# Patient Record
Sex: FEMALE | Race: WHITE | Employment: FULL TIME | URBAN - METROPOLITAN AREA
[De-identification: names, ages, dates, MRNs, and addresses within clinical notes are randomized per-mention and may not be internally consistent; named-entity substitution may affect disease eponyms.]

---

## 2024-10-21 ENCOUNTER — APPOINTMENT (OUTPATIENT)
Dept: GENERAL RADIOLOGY | Age: 49
End: 2024-10-21
Payer: COMMERCIAL

## 2024-10-21 ENCOUNTER — APPOINTMENT (OUTPATIENT)
Dept: CT IMAGING | Age: 49
End: 2024-10-21
Payer: COMMERCIAL

## 2024-10-21 ENCOUNTER — HOSPITAL ENCOUNTER (EMERGENCY)
Age: 49
Discharge: HOME | End: 2024-10-21
Payer: COMMERCIAL

## 2024-10-21 VITALS
SYSTOLIC BLOOD PRESSURE: 163 MMHG | BODY MASS INDEX: 47.09 KG/M2 | OXYGEN SATURATION: 96 % | RESPIRATION RATE: 16 BRPM | TEMPERATURE: 98.4 F | WEIGHT: 293 LBS | HEART RATE: 78 BPM | DIASTOLIC BLOOD PRESSURE: 76 MMHG | HEIGHT: 66 IN

## 2024-10-21 DIAGNOSIS — S80.12XA CONTUSION OF LEFT LOWER EXTREMITY, INITIAL ENCOUNTER: ICD-10-CM

## 2024-10-21 DIAGNOSIS — S99.912A INJURY OF LEFT ANKLE, INITIAL ENCOUNTER: Primary | ICD-10-CM

## 2024-10-21 PROCEDURE — 73560 X-RAY EXAM OF KNEE 1 OR 2: CPT

## 2024-10-21 PROCEDURE — 73610 X-RAY EXAM OF ANKLE: CPT

## 2024-10-21 PROCEDURE — 73700 CT LOWER EXTREMITY W/O DYE: CPT

## 2024-10-21 PROCEDURE — 6370000000 HC RX 637 (ALT 250 FOR IP): Performed by: PHYSICIAN ASSISTANT

## 2024-10-21 PROCEDURE — 99284 EMERGENCY DEPT VISIT MOD MDM: CPT

## 2024-10-21 RX ORDER — PANTOPRAZOLE SODIUM 40 MG/1
40 TABLET, DELAYED RELEASE ORAL DAILY
COMMUNITY

## 2024-10-21 RX ORDER — PROPRANOLOL HYDROCHLORIDE 60 MG/1
60 CAPSULE, EXTENDED RELEASE ORAL DAILY
COMMUNITY

## 2024-10-21 RX ORDER — PREDNISONE 10 MG/1
10 TABLET ORAL DAILY
COMMUNITY

## 2024-10-21 RX ORDER — FLUOXETINE 40 MG/1
40 CAPSULE ORAL DAILY
COMMUNITY

## 2024-10-21 RX ORDER — BUPROPION HYDROCHLORIDE 150 MG/1
150 TABLET, EXTENDED RELEASE ORAL 2 TIMES DAILY
COMMUNITY

## 2024-10-21 RX ORDER — IBUPROFEN 400 MG/1
800 TABLET, FILM COATED ORAL ONCE
Status: COMPLETED | OUTPATIENT
Start: 2024-10-21 | End: 2024-10-21

## 2024-10-21 RX ORDER — ERGOCALCIFEROL 1.25 MG/1
50000 CAPSULE ORAL WEEKLY
COMMUNITY

## 2024-10-21 RX ORDER — ALBUTEROL SULFATE 90 UG/1
2 INHALANT RESPIRATORY (INHALATION) EVERY 6 HOURS PRN
COMMUNITY

## 2024-10-21 RX ORDER — IBUPROFEN 800 MG/1
800 TABLET, FILM COATED ORAL EVERY 6 HOURS PRN
COMMUNITY

## 2024-10-21 RX ORDER — ROSUVASTATIN CALCIUM 40 MG/1
40 TABLET, COATED ORAL EVERY EVENING
COMMUNITY

## 2024-10-21 RX ORDER — ALPRAZOLAM 0.25 MG/1
0.25 TABLET ORAL NIGHTLY PRN
COMMUNITY

## 2024-10-21 RX ORDER — EZETIMIBE 10 MG/1
10 TABLET ORAL DAILY
COMMUNITY

## 2024-10-21 RX ADMIN — IBUPROFEN 800 MG: 400 TABLET, FILM COATED ORAL at 14:29

## 2024-10-21 ASSESSMENT — PAIN SCALES - GENERAL
PAINLEVEL_OUTOF10: 7
PAINLEVEL_OUTOF10: 6

## 2024-10-21 ASSESSMENT — ENCOUNTER SYMPTOMS
RESPIRATORY NEGATIVE: 1
ABDOMINAL PAIN: 0
EYES NEGATIVE: 1
ALLERGIC/IMMUNOLOGIC NEGATIVE: 1
SHORTNESS OF BREATH: 0
COLOR CHANGE: 0

## 2024-10-21 ASSESSMENT — PAIN DESCRIPTION - ORIENTATION: ORIENTATION: LEFT

## 2024-10-21 ASSESSMENT — PAIN DESCRIPTION - LOCATION: LOCATION: ANKLE

## 2024-10-21 ASSESSMENT — PAIN - FUNCTIONAL ASSESSMENT: PAIN_FUNCTIONAL_ASSESSMENT: 0-10

## 2024-10-21 NOTE — DISCHARGE INSTRUCTIONS
ANKLE SPRAIN Instructions      A sprained ankle occurs when a sudden movement or fall stretches the tendons, muscles or ligaments that surround the ankle.  This causes pain and swelling around the ankle.  The injury will heal, given time and rest.    Return to the ER Immediately if:  1.  If your pain worsens.  2.  You develop numbness or weakness.  3.  Your foot or leg become blue, purple, or white.  4.  You develop worsening, new, or concerning symptoms.       Care and Treatment:    1. For the first 48 hrs., applying an ice pack wrapped in a cloth over the area for 15 minutes every hour will help to prevent swelling and reduce pain.    2. For the first 48 hours, keep the injured foot elevated as much as possible.  Elevation and ice packs will help reduce pain and swelling.    3. Stay off the foot as much as possible until the pain and swelling are gone.  You may be advised to use a cane or crutches.    4. You may be instructed to use an elastic bandage or ankle support such as an aircast.  Remove the bandage at night and reapply in the morning.  The bandage or support should give gentle support  -  do not make it tight.

## 2024-10-21 NOTE — ED PROVIDER NOTES
Assessment  BP: (!) 146/91  Pulse: 87           PHYSICAL EXAM  1 or more Elements     ED Triage Vitals [10/21/24 1115]   BP Systolic BP Percentile Diastolic BP Percentile Temp Temp Source Pulse Respirations SpO2   (!) 146/91 -- -- 98.4 °F (36.9 °C) Oral 87 14 95 %      Height Weight - Scale         1.676 m (5' 6\") 136.1 kg (300 lb)             Physical Exam  Constitutional:       Appearance: Normal appearance. She is not ill-appearing, toxic-appearing or diaphoretic.   HENT:      Head: Normocephalic and atraumatic.   Cardiovascular:      Rate and Rhythm: Normal rate and regular rhythm.      Pulses: Normal pulses.      Heart sounds: Normal heart sounds. No murmur heard.     No friction rub. No gallop.   Pulmonary:      Effort: Pulmonary effort is normal. No respiratory distress.      Breath sounds: No stridor. No wheezing, rhonchi or rales.   Chest:      Chest wall: No tenderness.   Musculoskeletal:         General: Swelling, tenderness and signs of injury present. No deformity.      Right ankle: Normal. Normal pulse.      Right Achilles Tendon: No tenderness.      Left ankle: Swelling present. No ecchymosis. Tenderness present over the lateral malleolus, medial malleolus, base of 5th metatarsal and proximal fibula. Decreased range of motion. Normal pulse.      Left Achilles Tendon: No tenderness.      Comments: Inversion ankle injury. Tenderness on lateral malleolus, medial malleolus, and lateral foot. Further physical exam showed tenderness to distal tibia and fibular head.    Skin:     General: Skin is warm and dry.   Neurological:      General: No focal deficit present.      Mental Status: She is alert and oriented to person, place, and time.   Psychiatric:         Mood and Affect: Mood normal.         Behavior: Behavior normal.           DIAGNOSTIC RESULTS   LABS:    Labs Reviewed - No data to display    When ordered only abnormal lab results are displayed. All other labs were within normal range or not returned

## 2024-11-11 ENCOUNTER — OFFICE VISIT (OUTPATIENT)
Dept: ORTHOPEDIC SURGERY | Age: 49
End: 2024-11-11
Payer: COMMERCIAL

## 2024-11-11 VITALS — WEIGHT: 293 LBS | HEIGHT: 66 IN | BODY MASS INDEX: 47.09 KG/M2

## 2024-11-11 DIAGNOSIS — S93.402A SPRAIN OF LEFT ANKLE, UNSPECIFIED LIGAMENT, INITIAL ENCOUNTER: Primary | ICD-10-CM

## 2024-11-11 PROCEDURE — 99203 OFFICE O/P NEW LOW 30 MIN: CPT | Performed by: ORTHOPAEDIC SURGERY

## 2024-11-11 NOTE — PROGRESS NOTES
deltoid ligament and her CFL  Drawer test positive on right ankle, negative on left.  Talar tilt test positive right ankle, negative on left.      IMAGING:  Left ankle Xrays: I independently reviewed the images, as well as the radiology report. No acute fracture. Ankle mortise in anatomic position.     Left knee Xray: I independently reviewed the images, as well as the radiology report.  Subtle cortical defect versus lucency in the tibial plateau anteriorly  seen only on the lateral view with mild adjacent soft tissue swelling. This  may represent a nondisplaced fracture. Recommend correlation with point  tenderness and possibly CT.    CT left knee: I independently reviewed the images, as well as the radiology report.  Negative for fracture.       Impression:   Left ankle sprain  Class 3 obesity  Rheumatoid arthritis      Plan:   The xray findings were reviewed with the patient and explained to her that the pain was likely secondary to an ankle sprain, and that it should continue to improve the next 3-4 weeks.      Continue ibuprofen as needed.    Ice prn. The patient is advised to apply ice or cold packs intermittently 3-5x / day as needed to relieve pain. Ensure that the ice does not directly contact skin to avoid risk of frostbite.     Continue boot.  She does not have to wear the boot when not ambulating.  She can be weaned from the boot as tolerated.    PT referral provided.    Follow up in 6-8 weeks.          Chung Ortiz MD  Orthopaedic Surgery and Sports Medicine     Disclaimer:  This note was generated with use of a verbal recognition program and an attempt was made to check for errors.  It is possible that there are still dictated errors within this office note.  If so, please bring any significant errors to my attention for an addendum.  All efforts were made to ensure that this office note is accurate.